# Patient Record
Sex: FEMALE | Race: WHITE | Employment: OTHER | URBAN - METROPOLITAN AREA
[De-identification: names, ages, dates, MRNs, and addresses within clinical notes are randomized per-mention and may not be internally consistent; named-entity substitution may affect disease eponyms.]

---

## 2019-12-08 DIAGNOSIS — Z00.6 RESEARCH SUBJECT: Primary | ICD-10-CM

## 2019-12-19 ENCOUNTER — ANCILLARY PROCEDURE (OUTPATIENT)
Dept: GENERAL RADIOLOGY | Facility: CLINIC | Age: 64
End: 2019-12-19
Attending: DERMATOLOGY
Payer: COMMERCIAL

## 2019-12-19 ENCOUNTER — OFFICE VISIT (OUTPATIENT)
Dept: AUDIOLOGY | Facility: CLINIC | Age: 64
End: 2019-12-19
Payer: COMMERCIAL

## 2019-12-19 DIAGNOSIS — Z00.6 RESEARCH SUBJECT: ICD-10-CM

## 2019-12-19 DIAGNOSIS — H90.3 SENSORY HEARING LOSS, BILATERAL: Primary | ICD-10-CM

## 2019-12-19 NOTE — PROGRESS NOTES
AUDIOLOGY REPORT    SUMMARY: Audiology visit completed per research study protocol. See audiogram for results.      RECOMMENDATIONS: Follow-up as recommended by protocol.    Anna Gloria, [facility]  Licensed Audiologist  [place] License #[id number]

## 2019-12-27 DIAGNOSIS — Z00.6 RESEARCH SUBJECT: Primary | ICD-10-CM

## 2020-11-19 ENCOUNTER — OFFICE VISIT (OUTPATIENT)
Dept: AUDIOLOGY | Facility: CLINIC | Age: 65
End: 2020-11-19
Payer: COMMERCIAL

## 2020-11-19 DIAGNOSIS — Z00.6 RESEARCH STUDY PATIENT: Primary | ICD-10-CM

## 2020-11-19 PROCEDURE — 92552 PURE TONE AUDIOMETRY AIR: CPT | Performed by: AUDIOLOGIST

## 2020-11-19 NOTE — PROGRESS NOTES
AUDIOLOGY REPORT     SUMMARY: Audiology visit completed per research study protocol. See audiogram for results.       RECOMMENDATIONS: Follow-up as recommended by protocol.      Anna Olmedo.  Licensed Audiologist  [place] #[id number]

## 2020-12-15 DIAGNOSIS — Z00.6 RESEARCH SUBJECT: Primary | ICD-10-CM

## 2020-12-17 ENCOUNTER — ANCILLARY PROCEDURE (OUTPATIENT)
Dept: GENERAL RADIOLOGY | Facility: CLINIC | Age: 65
End: 2020-12-17
Attending: DERMATOLOGY
Payer: COMMERCIAL

## 2020-12-17 DIAGNOSIS — Z00.6 RESEARCH SUBJECT: ICD-10-CM

## 2020-12-17 DIAGNOSIS — Z00.6 RESEARCH SUBJECT: Primary | ICD-10-CM

## 2020-12-17 PROCEDURE — 71046 X-RAY EXAM CHEST 2 VIEWS: CPT | Mod: GC | Performed by: RADIOLOGY

## 2021-01-06 DIAGNOSIS — Z00.6 RESEARCH SUBJECT: Primary | ICD-10-CM

## 2021-01-07 ENCOUNTER — HOSPITAL ENCOUNTER (OUTPATIENT)
Dept: RESEARCH | Facility: CLINIC | Age: 66
End: 2021-01-07
Attending: DERMATOLOGY
Payer: COMMERCIAL

## 2021-01-14 ENCOUNTER — HOSPITAL ENCOUNTER (OUTPATIENT)
Dept: RESEARCH | Facility: CLINIC | Age: 66
End: 2021-01-14
Attending: DERMATOLOGY
Payer: COMMERCIAL

## 2021-03-11 ENCOUNTER — HOSPITAL ENCOUNTER (OUTPATIENT)
Dept: RESEARCH | Facility: CLINIC | Age: 66
End: 2021-03-11
Attending: DERMATOLOGY
Payer: COMMERCIAL

## 2021-03-11 PROCEDURE — 510N000009 HC RESEARCH FACILITY, PER 15 MIN

## 2021-03-11 PROCEDURE — 300N000007 HC RESEARCH B&I SPECIMEN PROCESSING, SIMPLE

## 2021-03-11 PROCEDURE — 300N000010 HC RESEARCH B&I SPECIMEN PACKAGING, COMPLEX

## 2021-03-11 PROCEDURE — 510N000023 HC CRU B&I PATIENT CARE, PER 15 MIN

## 2021-03-11 PROCEDURE — 84999 UNLISTED CHEMISTRY PROCEDURE: CPT

## 2021-03-11 NOTE — ADDENDUM NOTE
Encounter addended by: Santi Murray on: 3/11/2021 3:20 PM   Actions taken: Charge Capture section accepted

## 2021-03-12 NOTE — ADDENDUM NOTE
Encounter addended by: Brent Dobbins RN on: 3/12/2021 7:23 AM   Actions taken: Charge Capture section accepted

## 2021-03-12 NOTE — ADDENDUM NOTE
Encounter addended by: Brent Dobbins RN on: 3/12/2021 7:22 AM   Actions taken: Charge Capture section accepted

## 2021-03-15 NOTE — ADDENDUM NOTE
Encounter addended by: Carlota Cheema on: 3/15/2021 12:20 PM   Actions taken: Charge Capture section accepted

## 2021-06-16 ENCOUNTER — HOSPITAL ENCOUNTER (OUTPATIENT)
Dept: RESEARCH | Facility: CLINIC | Age: 66
End: 2021-06-16
Attending: DERMATOLOGY
Payer: COMMERCIAL

## 2021-06-16 ENCOUNTER — OFFICE VISIT (OUTPATIENT)
Dept: AUDIOLOGY | Facility: CLINIC | Age: 66
End: 2021-06-16
Payer: COMMERCIAL

## 2021-06-16 VITALS — WEIGHT: 166.7 LBS

## 2021-06-16 DIAGNOSIS — H90.3 SENSORINEURAL HEARING LOSS, BILATERAL: Primary | ICD-10-CM

## 2021-06-16 PROCEDURE — 92550 TYMPANOMETRY & REFLEX THRESH: CPT | Mod: 52 | Performed by: AUDIOLOGIST

## 2021-06-16 PROCEDURE — 510N000023 HC CRU B&I PATIENT CARE, PER 15 MIN

## 2021-06-16 PROCEDURE — 510N000009 HC RESEARCH FACILITY, PER 15 MIN

## 2021-06-16 PROCEDURE — 92557 COMPREHENSIVE HEARING TEST: CPT | Performed by: AUDIOLOGIST

## 2021-06-16 PROCEDURE — 300N000010 HC RESEARCH B&I SPECIMEN PACKAGING, COMPLEX

## 2021-06-16 PROCEDURE — 300N000007 HC RESEARCH B&I SPECIMEN PROCESSING, SIMPLE

## 2021-06-16 NOTE — PROGRESS NOTES
AUDIOLOGY REPORT    SUMMARY: Audiology visit completed per research study protocol.  See audiogram for results.  Note that full hearing evaluation was performed instead of protocol for 6 month visit as there was a note in the patient's study booklet indicating this as the patient's first audiology test even though there are previously hearing evaluations on file.  No one in connection with the research study could be reached to ask for clarification so entire hearing test was completed as to err on the side of caution.    RECOMMENDATIONS: Follow-up as recommended by protocol.        Nacho Bynum  Audiologist  [place] License  #[id number]

## 2021-06-16 NOTE — ADDENDUM NOTE
Encounter addended by: Santi Murray on: 6/16/2021 2:49 PM   Actions taken: Charge Capture section accepted

## 2021-09-17 ENCOUNTER — HOSPITAL ENCOUNTER (OUTPATIENT)
Dept: RESEARCH | Facility: CLINIC | Age: 66
End: 2021-09-17
Attending: DERMATOLOGY
Payer: COMMERCIAL

## 2021-09-17 PROCEDURE — 300N000010 HC RESEARCH B&I SPECIMEN PACKAGING, COMPLEX

## 2021-09-17 PROCEDURE — 510N000009 HC RESEARCH FACILITY, PER 15 MIN

## 2021-09-17 PROCEDURE — 510N000023 HC CRU B&I PATIENT CARE, PER 15 MIN

## 2021-09-17 PROCEDURE — 300N000007 HC RESEARCH B&I SPECIMEN PROCESSING, SIMPLE

## 2021-09-17 NOTE — ADDENDUM NOTE
Encounter addended by: Santi Murray on: 9/17/2021 2:12 PM   Actions taken: Charge Capture section accepted

## 2021-12-03 ENCOUNTER — HOSPITAL ENCOUNTER (OUTPATIENT)
Dept: RESEARCH | Facility: CLINIC | Age: 66
End: 2021-12-03
Attending: DERMATOLOGY
Payer: COMMERCIAL

## 2021-12-03 ENCOUNTER — OFFICE VISIT (OUTPATIENT)
Dept: AUDIOLOGY | Facility: CLINIC | Age: 66
End: 2021-12-03
Payer: COMMERCIAL

## 2021-12-03 VITALS
WEIGHT: 167.11 LBS | DIASTOLIC BLOOD PRESSURE: 83 MMHG | RESPIRATION RATE: 16 BRPM | SYSTOLIC BLOOD PRESSURE: 128 MMHG | HEART RATE: 103 BPM | TEMPERATURE: 98.1 F

## 2021-12-03 DIAGNOSIS — Z00.6 RESEARCH STUDY PATIENT: Primary | ICD-10-CM

## 2021-12-03 PROCEDURE — 510N000009 HC RESEARCH FACILITY, PER 15 MIN

## 2021-12-03 PROCEDURE — 99207 PR ASSESSMENT FOR HEARING AID: CPT | Performed by: AUDIOLOGIST

## 2021-12-03 PROCEDURE — 510N000023 HC CRU B&I PATIENT CARE, PER 15 MIN

## 2021-12-03 PROCEDURE — 300N000010 HC RESEARCH B&I SPECIMEN PACKAGING, COMPLEX

## 2021-12-03 PROCEDURE — 300N000007 HC RESEARCH B&I SPECIMEN PROCESSING, SIMPLE

## 2021-12-03 NOTE — PROGRESS NOTES
AUDIOLOGY REPORT    SUBJECTIVE:  Breanna Ross is a 66 year old female who was seen in the Audiology Clinic at the [facility] for audiologic evaluation, referred by Dermatology Study. The patient has been seen previously in this clinic on 6/16/2021 for assessment and results indicated normal sloping to moderate sensorineural hearing loss bilaterally.  Patient reports no change in hearing since last tested on 6/16/2021.  Denies tinnitus, aural fullness/pressure, drainage, pain and dizziness.       OBJECTIVE:  Abuse Screening:  Do you feel unsafe at home or work/school? No  Do you feel threatened by someone? No  Does anyone try to keep you from having contact with others, or doing things outside of your home? No  Physical signs of abuse present? No     Fall Risk Screen:  1. Have you fallen two or more times in the past year? No  2. Have you fallen and had an injury in the past year? No      Otoscopic exam indicates left occluding cerumen. Cerumen extraction was attempted using a lighted curette without incident. Cerumen could not be extracted.     ASSESSMENT:   Hearing evaluation could not be performed today due to left occluding cerumen.     PLAN:  It is recommended that the patient return for hearing evaluation following an ear cleaning. The use of Debrox was discussed with the patient to soften cerumen.  Please call this clinic with questions regarding these results or recommendations.        Doreen Jain M.A.   Audiology Doctoral Student #[id number]      I was present with the patient for the entire Audiology appointment including all procedures/testing performed by the AuD student, and agree with the student s assessment and plan as documented.      Anna Evans., [facility]  Licensed Audiologist  [place] #[id number]

## 2021-12-03 NOTE — ADDENDUM NOTE
Encounter addended by: Santi Murray on: 12/3/2021 2:10 PM   Actions taken: Charge Capture section accepted

## 2021-12-04 ENCOUNTER — HOSPITAL ENCOUNTER (OUTPATIENT)
Dept: RESEARCH | Facility: CLINIC | Age: 66
End: 2021-12-04
Attending: DERMATOLOGY
Payer: COMMERCIAL

## 2021-12-04 DIAGNOSIS — Z00.6 RESEARCH SUBJECT: Primary | ICD-10-CM

## 2021-12-04 PROCEDURE — 300N000007 HC RESEARCH B&I SPECIMEN PROCESSING, SIMPLE

## 2021-12-06 ENCOUNTER — HOSPITAL ENCOUNTER (OUTPATIENT)
Dept: RESEARCH | Facility: CLINIC | Age: 66
End: 2021-12-06
Attending: DERMATOLOGY
Payer: COMMERCIAL

## 2021-12-06 PROCEDURE — 300N000010 HC RESEARCH B&I SPECIMEN PACKAGING, COMPLEX

## 2022-01-07 ENCOUNTER — OFFICE VISIT (OUTPATIENT)
Dept: AUDIOLOGY | Facility: CLINIC | Age: 67
End: 2022-01-07

## 2022-01-07 ENCOUNTER — HOSPITAL ENCOUNTER (OUTPATIENT)
Dept: RESEARCH | Facility: CLINIC | Age: 67
End: 2022-01-07
Attending: DERMATOLOGY
Payer: COMMERCIAL

## 2022-01-07 DIAGNOSIS — Z00.6 RESEARCH STUDY PATIENT: ICD-10-CM

## 2022-01-07 DIAGNOSIS — H90.3 SENSORINEURAL HEARING LOSS, BILATERAL: Primary | ICD-10-CM

## 2022-01-07 PROCEDURE — 92550 TYMPANOMETRY & REFLEX THRESH: CPT | Performed by: AUDIOLOGIST

## 2022-01-07 PROCEDURE — 510N000023 HC CRU B&I PATIENT CARE, PER 15 MIN

## 2022-01-07 PROCEDURE — 300N000010 HC RESEARCH B&I SPECIMEN PACKAGING, COMPLEX

## 2022-01-07 PROCEDURE — 510N000009 HC RESEARCH FACILITY, PER 15 MIN

## 2022-01-07 PROCEDURE — 92557 COMPREHENSIVE HEARING TEST: CPT | Performed by: AUDIOLOGIST

## 2022-01-07 PROCEDURE — 300N000007 HC RESEARCH B&I SPECIMEN PROCESSING, SIMPLE

## 2022-01-07 NOTE — ADDENDUM NOTE
Encounter addended by: Santi Murray on: 1/7/2022 1:58 PM   Actions taken: Charge Capture section accepted

## 2022-01-07 NOTE — PROGRESS NOTES
AUDIOLOGY REPORT    SUMMARY: Audiology visit completed per research study protocol.  See audiogram for results.  Note that full hearing evaluation was performed according to early termination protocol.     RECOMMENDATIONS: Follow-up as recommended by protocol.      Doreen Jain M.A.   Audiology Doctoral Student #[id number]    I was present with the patient for the entire Audiology appointment including all procedures/testing performed by the AuD student, and agree with the student s assessment and plan as documented.      Anna Evans., [facility]  Licensed Audiologist  [place] #[id number]

## 2022-01-07 NOTE — ADDENDUM NOTE
Encounter addended by: Maria Del Carmen Luis RN on: 1/7/2022 1:36 PM   Actions taken: Charge Capture section accepted

## 2022-01-07 NOTE — ADDENDUM NOTE
Encounter addended by: Santi Murray on: 1/7/2022 2:46 PM   Actions taken: Charge Capture section accepted